# Patient Record
Sex: FEMALE | Race: WHITE | NOT HISPANIC OR LATINO | Employment: UNEMPLOYED | URBAN - METROPOLITAN AREA
[De-identification: names, ages, dates, MRNs, and addresses within clinical notes are randomized per-mention and may not be internally consistent; named-entity substitution may affect disease eponyms.]

---

## 2017-03-21 ENCOUNTER — HOSPITAL ENCOUNTER (EMERGENCY)
Facility: HOSPITAL | Age: 42
Discharge: HOME | End: 2017-03-21
Attending: FAMILY MEDICINE
Payer: MEDICARE

## 2017-03-21 VITALS
WEIGHT: 175 LBS | OXYGEN SATURATION: 99 % | RESPIRATION RATE: 20 BRPM | HEIGHT: 65 IN | BODY MASS INDEX: 29.16 KG/M2 | SYSTOLIC BLOOD PRESSURE: 145 MMHG | TEMPERATURE: 98 F | HEART RATE: 85 BPM | DIASTOLIC BLOOD PRESSURE: 98 MMHG

## 2017-03-21 DIAGNOSIS — S93.602A FOOT SPRAIN, LEFT, INITIAL ENCOUNTER: Primary | ICD-10-CM

## 2017-03-21 DIAGNOSIS — M79.671 FOOT PAIN, BILATERAL: ICD-10-CM

## 2017-03-21 DIAGNOSIS — M79.672 FOOT PAIN, BILATERAL: ICD-10-CM
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
## 2017-03-21 PROCEDURE — 25000003 PHARM REV CODE 250: Performed by: FAMILY MEDICINE

## 2017-03-21 PROCEDURE — 99283 EMERGENCY DEPT VISIT LOW MDM: CPT

## 2017-03-21 RX ORDER — IBUPROFEN 800 MG/1
800 TABLET ORAL 3 TIMES DAILY PRN
Qty: 20 TABLET | Refills: 0 | Status: SHIPPED | OUTPATIENT
Start: 2017-03-21 | End: 2017-05-04

## 2017-03-21 RX ORDER — KETOROLAC TROMETHAMINE 10 MG/1
10 TABLET, FILM COATED ORAL
Status: COMPLETED | OUTPATIENT
Start: 2017-03-21 | End: 2017-03-21

## 2017-03-21 RX ORDER — HYDROCODONE BITARTRATE AND ACETAMINOPHEN 10; 325 MG/1; MG/1
1 TABLET ORAL
Status: COMPLETED | OUTPATIENT
Start: 2017-03-21 | End: 2017-03-21

## 2017-03-21 RX ORDER — HYDROCODONE BITARTRATE AND ACETAMINOPHEN 5; 325 MG/1; MG/1
1 TABLET ORAL EVERY 12 HOURS PRN
Qty: 10 TABLET | Refills: 0 | Status: SHIPPED | OUTPATIENT
Start: 2017-03-21 | End: 2017-05-04

## 2017-03-21 RX ADMIN — KETOROLAC TROMETHAMINE 10 MG: 10 TABLET, FILM COATED ORAL at 02:03

## 2017-03-21 RX ADMIN — HYDROCODONE BITARTRATE AND ACETAMINOPHEN 1 TABLET: 10; 325 TABLET ORAL at 03:03

## 2017-03-21 NOTE — ED AVS SNAPSHOT
[facility]  [address]  [place] [address]               Anabell Anton   3/21/2017  2:23 PM   ED    Description:  Female : 1975   Department:  [facility]           Your Care was Coordinated By:     Provider Role From To    Bryn Ramirez MD Attending Provider [phone] --      Reason for Visit     Foot Injury           Diagnoses this Visit        Comments    Foot sprain, left, initial encounter    -  Primary     Fall         Foot pain, bilateral           ED Disposition     None           To Do List           Follow-up Information     Follow up In 1 week.    Contact information:    physician       These Medications        Disp Refills Start End    hydrocodone-acetaminophen 5-325mg (NORCO) 5-325 mg per tablet 10 tablet 0 3/21/2017     Take 1 tablet by mouth every 12 (twelve) hours as needed. - Oral    ibuprofen (ADVIL,MOTRIN) 800 MG tablet 20 tablet 0 3/21/2017     Take 1 tablet (800 mg total) by mouth 3 (three) times daily as needed for Pain. - Oral      [facility]     [facility] Nurse Care [facility] -  Assistance  Registered nurses in the [facility] provide clinical advisement, health education, appointment booking, and other advisory services.  Call for this free service at [phone].             Medications           Message regarding Medications     Verify the changes and/or additions to your medication regime listed below are the same as discussed with your clinician today.  If any of these changes or additions are incorrect, please notify your healthcare provider.        START taking these NEW medications        Refills    hydrocodone-acetaminophen 5-325mg (NORCO) 5-325 mg per tablet 0    Sig: Take 1 tablet by mouth every 12 (twelve) hours as needed.    Class: Print    Route: Oral    ibuprofen (ADVIL,MOTRIN) 800 MG tablet 0    Sig: Take 1 tablet (800 mg total) by mouth 3 (three) times daily as needed  "for Pain.    Class: Print    Route: Oral      These medications were administered today        Dose Freq    ketorolac tablet 10 mg 10 mg ED 1 Time    Sig: Take 1 tablet (10 mg total) by mouth ED 1 Time.    Class: Normal    Route: Oral    hydrocodone-acetaminophen 10-325mg per tablet 1 tablet 1 tablet ED 1 Time    Sig: Take 1 tablet by mouth ED 1 Time.    Class: Normal    Route: Oral           Verify that the below list of medications is an accurate representation of the medications you are currently taking.  If none reported, the list may be blank. If incorrect, please contact your healthcare provider. Carry this list with you in case of emergency.           Current Medications     hydrocodone-acetaminophen 10-325mg per tablet 1 tablet Take 1 tablet by mouth ED 1 Time.    hydrocodone-acetaminophen 5-325mg (NORCO) 5-325 mg per tablet Take 1 tablet by mouth every 12 (twelve) hours as needed.    ibuprofen (ADVIL,MOTRIN) 800 MG tablet Take 1 tablet (800 mg total) by mouth 3 (three) times daily as needed for Pain.           Clinical Reference Information           Your Vitals Were     BP Pulse Temp Resp Height Weight    157/106 (BP Location: Right arm, Patient Position: Sitting) 81 98.2 °F (36.8 °C) (Oral) 18 5' 5" (1.651 m) 79.4 kg (175 lb)    SpO2 BMI             97% 29.12 kg/m2         Allergies as of 3/21/2017        Reactions    Compazine [Prochlorperazine Edisylate] Anxiety    Reglan [Metoclopramide Hcl] Anxiety      Immunizations Administered on Date of Encounter - 3/21/2017     None      ED Micro, Lab, POCT     None      ED Imaging Orders     Start Ordered       Status Ordering Provider    03/21/17 1444 03/21/17 1443    1 time imaging,   Status:  Canceled      Canceled     03/21/17 1443 03/21/17 1443    1 time imaging,   Status:  Canceled      Canceled     03/21/17 1414 03/21/17 1407  X-Ray Foot Complete Bilateral  1 time imaging      Final result     03/21/17 1408 03/21/17 1407    1 time imaging,   Status:  " Canceled      Canceled     03/21/17 1407 03/21/17 1407    1 time imaging,   Status:  Canceled      Canceled       Discharge References/Attachments     STRAINS AND SPRAINS, TREATING (ENGLISH)      [facility] Sign-Up     Activating your [facility] account is as easy as 1-2-3!     1) Visit [email or web address], select Sign Up Now, enter this activation code and your date of birth, then select Next.  [email or web address]  Expires: 5/5/2017  3:33 PM      2) Create a username and password to use when you visit [facility] in the future and select a security question in case you lose your password and select Next.    3) Enter your e-mail address and click Sign Up!    Additional Information  If you have questions, please e-mail [email or web address] or call [phone] to talk to our [facility] staff. Remember, [facility] is NOT to be used for urgent needs. For medical emergencies, dial 911.         Smoking Cessation     If you would like to quit smoking:   You may be eligible for free services if you are a [place] resident and started smoking cigarettes before September 1, 1988.  Call the Smoking Cessation Trust ([facility]) toll free at (160) 394-2916 or (648) 789-4008.   Call 9-547-QUIT-NOW if you do not meet the above criteria.             [facility] complies with applicable Federal civil rights laws and does not discriminate on the basis of race, color, national origin, age, disability, or sex.        Language Assistance Services     ATTENTION: Language assistance services are available, free of charge. Please call [phone].      ATENCIÓN: Si habla español, tiene a shearer disposición servicios gratuitos de asistencia lingüística. Llame al [phone].     CHÚ Ý: N?u b?n nói Ti?ng Vi?t, có các d?ch v? h? tr? ngôn ng? mi?n phí dành cho b?n. G?i s? [phone].

## 2017-05-04 ENCOUNTER — OFFICE VISIT (OUTPATIENT)
Dept: FAMILY MEDICINE | Facility: CLINIC | Age: 42
End: 2017-05-04
Payer: MEDICARE

## 2017-05-04 VITALS
SYSTOLIC BLOOD PRESSURE: 134 MMHG | TEMPERATURE: 98 F | HEIGHT: 65 IN | HEART RATE: 78 BPM | OXYGEN SATURATION: 100 % | WEIGHT: 239.44 LBS | DIASTOLIC BLOOD PRESSURE: 86 MMHG | BODY MASS INDEX: 39.89 KG/M2

## 2017-05-04 DIAGNOSIS — M19.079 ANKLE ARTHRITIS: Primary | ICD-10-CM

## 2017-05-04 DIAGNOSIS — F42.9 OBSESSIVE-COMPULSIVE DISORDER, UNSPECIFIED TYPE: ICD-10-CM

## 2017-05-04 DIAGNOSIS — S42.035S CLOSED NONDISPLACED FRACTURE OF ACROMIAL END OF LEFT CLAVICLE, SEQUELA: ICD-10-CM

## 2017-05-04 DIAGNOSIS — F43.10 PTSD (POST-TRAUMATIC STRESS DISORDER): ICD-10-CM

## 2017-05-04 DIAGNOSIS — G89.4 CHRONIC PAIN SYNDROME: ICD-10-CM

## 2017-05-04 PROBLEM — G89.29 CHRONIC PAIN: Status: ACTIVE | Noted: 2017-05-04

## 2017-05-04 PROBLEM — S42.009A FRACTURE, CLAVICLE: Status: ACTIVE | Noted: 2017-05-04

## 2017-05-04 PROCEDURE — 99499 UNLISTED E&M SERVICE: CPT | Mod: S$GLB,,, | Performed by: FAMILY MEDICINE

## 2017-05-04 PROCEDURE — 99204 OFFICE O/P NEW MOD 45 MIN: CPT | Mod: S$GLB,,, | Performed by: FAMILY MEDICINE

## 2017-05-04 PROCEDURE — 1160F RVW MEDS BY RX/DR IN RCRD: CPT | Mod: S$GLB,,, | Performed by: FAMILY MEDICINE

## 2017-05-04 RX ORDER — OXYCODONE AND ACETAMINOPHEN 10; 325 MG/1; MG/1
TABLET ORAL
COMMUNITY
End: 2017-05-16

## 2017-05-04 RX ORDER — DIAZEPAM 10 MG/1
10 TABLET ORAL 2 TIMES DAILY
COMMUNITY
End: 2017-05-16

## 2017-05-04 NOTE — MR AVS SNAPSHOT
[address] [address]  Phone: [phone]  Fax: [phone]                  Anabell Anton   2017 9:20 AM   Office Visit    Description:  Female : 1975   Provider:  Richard Carnes MD   Department:  [facility]           Reason for Visit     Establish Care           Diagnoses this Visit        Comments    Ankle arthritis    -  Primary     Chronic pain syndrome         PTSD (post-traumatic stress disorder)         Obsessive-compulsive disorder, unspecified type         Closed nondisplaced fracture of acromial end of left clavicle, sequela                To Do List           Goals (5 Years of Data)     None      Follow-Up and Disposition     Return in about 6 months (around 2017).      [facility] On [facility]     [facility] On Call Nurse Care Line -  Assistance  Unless otherwise directed by your provider, please contact [facility], our nurse care line that is available for  assistance.     Registered nurses in the [facility] On Call Center provide: appointment scheduling, clinical advisement, health education, and other advisory services.  Call: [phone] (toll free)               Medications           Message regarding Medications     Verify the changes and/or additions to your medication regime listed below are the same as discussed with your clinician today.  If any of these changes or additions are incorrect, please notify your healthcare provider.        STOP taking these medications     hydrocodone-acetaminophen 5-325mg (NORCO) 5-325 mg per tablet Take 1 tablet by mouth every 12 (twelve) hours as needed.    ibuprofen (ADVIL,MOTRIN) 800 MG tablet Take 1 tablet (800 mg total) by mouth 3 (three) times daily as needed for Pain.           Verify that the below list of medications is an accurate representation of the medications you are currently taking.  If none reported, the list may be blank. If incorrect, please contact your  "healthcare provider. Carry this list with you in case of emergency.           Current Medications     diazePAM (VALIUM) 10 MG Tab Take 10 mg by mouth 2 (two) times daily.    oxycodone-acetaminophen (PERCOCET)  mg per tablet Take one tablet by mouth every 3 hours           Clinical Reference Information           Your Vitals Were     BP Pulse Temp Height Weight SpO2    134/86 78 97.8 °F (36.6 °C) (Oral) 5' 5" (1.651 m) 108.6 kg (239 lb 6.7 oz) 100%    BMI                39.84 kg/m2          Blood Pressure          Most Recent Value    BP  134/86      Allergies as of 5/4/2017     Compazine [Prochlorperazine Edisylate]    Reglan [Metoclopramide Hcl]      Immunizations Administered on Date of Encounter - 5/4/2017     None      Orders Placed During Today's Visit      Normal Orders This Visit    Ambulatory referral to Pain Clinic     Future Labs/Procedures Expected by Expires    CBC auto differential  5/4/2017 7/3/2018    Comprehensive metabolic panel  5/4/2017 7/3/2018    Mammo Digital Screening Bilat with CAD  5/4/2017 7/5/2018    Lipid panel  As directed 5/4/2018    Sedimentation rate, manual  As directed 5/4/2018    Urinalysis  As directed 5/4/2018      [facility] Sign-Up     Activating your [facility] account is as easy as 1-2-3!     1) Visit [email or web address], select Sign Up Now, enter this activation code and your date of birth, then select Next.  [email or web address]  Expires: 5/5/2017  3:33 PM      2) Create a username and password to use when you visit [facility] in the future and select a security question in case you lose your password and select Next.    3) Enter your e-mail address and click Sign Up!    Additional Information  If you have questions, please e-mail [email or web address] or call [phone] to talk to our [facility] staff. Remember, [facility] is NOT to be used for urgent needs. For medical emergencies, dial 911.         Smoking Cessation     If you would like to quit smoking:   You may be " eligible for free services if you are a [place] resident and started smoking cigarettes before September 1, 1988.  Call the Smoking Cessation Trust ([facility]) toll free at (312) 948-9944 or (251) 323-5553.   Call 1-800-QUIT-NOW if you do not meet the above criteria.   Contact us via email: [email or web address]   View our website for more information: [email or web address]        Language Assistance Services     ATTENTION: Language assistance services are available, free of charge. Please call [phone].      ATENCIÓN: Si habla español, tiene a shearer disposición servicios gratuitos de asistencia lingüística. Llame al [phone].     CHÚ Ý: N?u b?n nói Ti?ng Vi?t, có các d?ch v? h? tr? ngôn ng? mi?n phí dành cho b?n. G?i s? [phone].         [facility] complies with applicable Federal civil rights laws and does not discriminate on the basis of race, color, national origin, age, disability, or sex.

## 2017-05-04 NOTE — PROGRESS NOTES
Subjective:      Patient ID: Anabell Anton is a 41 y.o. female.    Chief Complaint: Establish Care    HPI Comments: Moved here from [place]; from [place]; here 2 months; fiance;  next month; he's a ; lives with the person he is a  for; establish care ; fell in March and left foot hurts; went to ER here; xray; no fractures;     Review of Systems   Constitutional: Negative.    HENT: Negative.    Respiratory: Negative.    Cardiovascular: Negative.    Gastrointestinal: Negative.    Endocrine: Negative.    Genitourinary: Negative.    Musculoskeletal: Positive for back pain.   Psychiatric/Behavioral: Negative.    All other systems reviewed and are negative.    Objective:     Physical Exam   Constitutional: She is oriented to person, place, and time. She appears well-developed and well-nourished.   obese   HENT:   Head: Normocephalic.   Eyes: Conjunctivae and EOM are normal. Pupils are equal, round, and reactive to light.   Neck: Normal range of motion. Neck supple.   Cardiovascular: Normal rate, regular rhythm and normal heart sounds.    Pulmonary/Chest: Effort normal and breath sounds normal.   Musculoskeletal: She exhibits deformity.   Neurological: She is alert and oriented to person, place, and time. She has normal reflexes.   Skin: Skin is warm and dry.   Psychiatric: She has a normal mood and affect. Her behavior is normal. Judgment and thought content normal.   Nursing note and vitals reviewed.    Assessment:     1. Ankle arthritis    2. Chronic pain syndrome    3. PTSD (post-traumatic stress disorder)    4. Obsessive-compulsive disorder, unspecified type    5. Closed nondisplaced fracture of acromial end of left clavicle, sequela      Plan:     New Prescriptions    No medications on file     Discontinued Medications    HYDROCODONE-ACETAMINOPHEN 5-325MG (NORCO) 5-325 MG PER TABLET    Take 1 tablet by mouth every 12 (twelve) hours as needed.    IBUPROFEN (ADVIL,MOTRIN) 800 MG TABLET     Take 1 tablet (800 mg total) by mouth 3 (three) times daily as needed for Pain.     Modified Medications    No medications on file       Ankle arthritis  -     Ambulatory referral to Pain Clinic  -     [facility] auto differential; Future; Expected date: 5/4/17  -     Comprehensive metabolic panel; Future; Expected date: 5/4/17  -     Lipid panel; Future  -     Sedimentation rate, manual; Future  -     Urinalysis; Future  -     Mammo Digital Screening Bilat with CAD; Future; Expected date: 5/4/17    Chronic pain syndrome  -     Ambulatory referral to Pain Clinic  -     [facility] auto differential; Future; Expected date: 5/4/17  -     Comprehensive metabolic panel; Future; Expected date: 5/4/17  -     Lipid panel; Future  -     Sedimentation rate, manual; Future  -     Urinalysis; Future  -     Mammo Digital Screening Bilat with CAD; Future; Expected date: 5/4/17    PTSD (post-traumatic stress disorder)  -     Ambulatory referral to Pain Clinic  -     [facility] auto differential; Future; Expected date: 5/4/17  -     Comprehensive metabolic panel; Future; Expected date: 5/4/17  -     Lipid panel; Future  -     Sedimentation rate, manual; Future  -     Urinalysis; Future  -     Mammo Digital Screening Bilat with CAD; Future; Expected date: 5/4/17    Obsessive-compulsive disorder, unspecified type  -     Ambulatory referral to Pain Clinic  -     [facility] auto differential; Future; Expected date: 5/4/17  -     Comprehensive metabolic panel; Future; Expected date: 5/4/17  -     Lipid panel; Future  -     Sedimentation rate, manual; Future  -     Urinalysis; Future  -     Mammo Digital Screening Bilat with CAD; Future; Expected date: 5/4/17    Closed nondisplaced fracture of acromial end of left clavicle, sequela  -     Ambulatory referral to Pain Clinic  -     [facility] auto differential; Future; Expected date: 5/4/17  -     Comprehensive metabolic panel; Future; Expected date: 5/4/17  -     Lipid panel; Future  -     Sedimentation rate, manual;  Future  -     Urinalysis; Future  -     Mammo Digital Screening Bilat with CAD; Future; Expected date: 5/4/17

## 2017-05-09 ENCOUNTER — TELEPHONE (OUTPATIENT)
Dept: PAIN MEDICINE | Facility: CLINIC | Age: 42
End: 2017-05-09

## 2017-05-09 NOTE — TELEPHONE ENCOUNTER
Spoke with Anabell and advised we need her records since we have nothing in her chart, no imaging, no doctor visits or notes and nothing to go by.  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  She has had 8 surgeries plus many procedures.  States every time she has a procedure or a surgery she gets MRSA.  States the last operation on her foot she got MRSA and almost  so she refuses to have any more procedures.  Advised that she can go by the office and sign a request for records and once we get them and they are reviewed by the doctor we will call her for appointment.  She verbalized understanding.

## 2017-05-16 ENCOUNTER — OFFICE VISIT (OUTPATIENT)
Dept: INTERNAL MEDICINE | Facility: CLINIC | Age: 42
End: 2017-05-16
Payer: MEDICARE

## 2017-05-16 VITALS
DIASTOLIC BLOOD PRESSURE: 90 MMHG | BODY MASS INDEX: 39.71 KG/M2 | TEMPERATURE: 99 F | SYSTOLIC BLOOD PRESSURE: 122 MMHG | WEIGHT: 238.31 LBS | RESPIRATION RATE: 17 BRPM | HEART RATE: 72 BPM | HEIGHT: 65 IN

## 2017-05-16 DIAGNOSIS — Z76.5 DRUG-SEEKING BEHAVIOR: Primary | ICD-10-CM

## 2017-05-16 PROCEDURE — 1160F RVW MEDS BY RX/DR IN RCRD: CPT | Mod: S$GLB,,, | Performed by: INTERNAL MEDICINE

## 2017-05-16 PROCEDURE — 99212 OFFICE O/P EST SF 10 MIN: CPT | Mod: S$GLB,,, | Performed by: INTERNAL MEDICINE

## 2017-05-16 PROCEDURE — 99999 PR PBB SHADOW E&M-EST. PATIENT-LVL III: CPT | Mod: PBBFAC,,, | Performed by: INTERNAL MEDICINE

## 2017-05-16 NOTE — PROGRESS NOTES
Subjective:       Patient ID: Anabell Anton is a 41 y.o. female.    Chief Complaint: Establish Care; Medication Refill; Foot Pain (left foot); Eye Problem (has a bubble on the right eye); and Low-back Pain    HPI  Pt came here requesting medication for chronic back pain.  She saw Dr Carnes on 5/4, who referred her to pain management, which she refused to go to, and subsequently saw Georgia Foley and Eun before coming here today.  When confronted with this, and being told I didn't feel comfortable prescribing opiates to her she stormed out.  Review of Systems    Objective:      Physical Exam   Psychiatric:   angry       Assessment:       1. Drug-seeking behavior        Plan:       Anabell was seen today for establish care, medication refill, foot pain, eye problem and low-back pain.    Diagnoses and all orders for this visit:    Drug-seeking behavior      No Follow-up on file.

## 2017-05-18 ENCOUNTER — TELEPHONE (OUTPATIENT)
Dept: PAIN MEDICINE | Facility: CLINIC | Age: 42
End: 2017-05-18

## 2017-05-18 NOTE — TELEPHONE ENCOUNTER
Left message for patient to call.  Need to advise her that as I explained to her on 5/9/2017 she needs to come in a sign a request for records from her doctors in [place] and [place] so we can get those records because we have nothing regarding what she was being treated for or her back surgeries, etc.  We need to have those for the doctor to review prior to him seeing her.

## 2017-05-23 ENCOUNTER — TELEPHONE (OUTPATIENT)
Dept: PAIN MEDICINE | Facility: CLINIC | Age: 42
End: 2017-05-23